# Patient Record
Sex: FEMALE | Race: WHITE
[De-identification: names, ages, dates, MRNs, and addresses within clinical notes are randomized per-mention and may not be internally consistent; named-entity substitution may affect disease eponyms.]

---

## 2019-07-11 ENCOUNTER — HOSPITAL ENCOUNTER (EMERGENCY)
Dept: HOSPITAL 63 - ER | Age: 24
Discharge: HOME | End: 2019-07-11
Payer: COMMERCIAL

## 2019-07-11 VITALS — SYSTOLIC BLOOD PRESSURE: 142 MMHG | DIASTOLIC BLOOD PRESSURE: 84 MMHG

## 2019-07-11 VITALS — WEIGHT: 150 LBS | HEIGHT: 68 IN | BODY MASS INDEX: 22.73 KG/M2

## 2019-07-11 DIAGNOSIS — S29.012A: Primary | ICD-10-CM
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
## 2019-07-11 DIAGNOSIS — S39.012A: ICD-10-CM
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
## 2019-07-11 PROCEDURE — 99283 EMERGENCY DEPT VISIT LOW MDM: CPT

## 2019-07-11 NOTE — PHYS DOC
Past History


Past Medical History:  No Pertinent History


Past Surgical History:  Other


Alcohol Use:  None


Drug Use:  None





Adult General


Chief Complaint


Chief Complaint:  BACK PAIN OR INJURY





HPI


HPI





Patient is a 23-year-old female who presents with complaint of left-sided back 

pain that started yesterday. 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 

at work and there was one child that repeatedly required lifting and was not 

cooperative. Patient states that since that time she has had pain in her mid 

thoracic all the way down through her lumbar spine on the left. She denies any 

loss of bowel or bladder function and also denies any saddle anesthesia. She 

rates pain as moderate and states the pain is worsened with movement of the 

trunk or bending.[]





Review of Systems


Review of Systems





Constitutional: Denies fever or chills []


Respiratory: Denies cough or shortness of breath []


Cardiovascular: No additional information not addressed in HPI []


Musculoskeletal: Positive left sided back pain []


Integument: Denies rash or skin lesions []


Neurologic: Denies headache, focal weakness or sensory changes []





Allergies


Allergies





Allergies








Coded Allergies Type Severity Reaction Last Updated Verified


 


  No Known Drug Allergies    10/20/16 No











Physical Exam


Physical Exam





Constitutional: Well developed, well nourished, no acute distress, non-toxic 

appearance. []


Cardiovascular:Heart rate regular rhythm, no murmur []


Lungs & Thorax:  Bilateral breath sounds clear to auscultation []


Back: There is tenderness to palpation with palpable spasm in the mid to lower 

thoracic and lumbar paraspinal musculature on the left. [] 


Extremities: No tenderness, no cyanosis, no clubbing, ROM intact, no edema. []





Current Patient Data


Vital Signs





                                   Vital Signs








  Date Time  Temp Pulse Resp B/P (MAP) Pulse Ox O2 Delivery O2 Flow Rate FiO2


 


7/11/19 11:28 98.3 82 16  96 Room Air  











EKG


EKG


[]





Radiology/Procedures


Radiology/Procedures


[]





Course & Med Decision Making


Course & Med Decision Making


Pertinent Labs and Imaging studies reviewed. (See chart for details)





[]





Dragon Disclaimer


Dragon Disclaimer


This electronic medical record was generated, in whole or in part, using a voice

 recognition dictation system.





Departure


Departure:


Impression:  


   Primary Impression:  


   Acute thoracic myofascial strain


   Additional Impression:  


   Acute lumbar myofascial strain


Disposition:  01 HOME, SELF-CARE


Condition:  STABLE


Referrals:  


SIMONA VILLALBA MD (PCP)


Patient Instructions:  Back Pain, Adult, Form - Excuse from Work, School, or 

Physical Activity, Muscle Strain


Scripts


Ketorolac Tromethamine (KETOROLAC TROMETHAMINE) 10 Mg Tablet


1 TAB PO PRN Q6HRS PRN for PAIN, #20 TAB


   Prov: BRIELLE SETH Jr. DO         7/11/19 


Orphenadrine Citrate (ORPHENADRINE CITRATE) 100 Mg Tablet.er


1 TAB PO BID PRN for MUSCLE SPASMS, #14 TAB


   Prov: BRIELLE SETH Jr. DO         7/11/19 


Acetaminophen With Codeine (TYLENOL WITH CODEINE #3 TABLET) 1 Each Tablet


1 TAB PO Q4-6HRS PRN for PAIN, #14 TAB


   Prov: BRIELLE SETH Jr. DO         7/11/19





Problem Qualifiers








   Primary Impression:  


   Acute thoracic myofascial strain


   Encounter type:  initial encounter  Qualified Codes:  S29.019A - Strain of 

   muscle and tendon of unspecified wall of thorax, initial encounter


   Additional Impression:  


   Acute lumbar myofascial strain


   Encounter type:  initial encounter  Qualified Codes:  S39.012A - Strain of 

   muscle, fascia and tendon of lower back, initial encounter








BRIELLE SETH Jr. DO          Jul 11, 2019 11:46